# Patient Record
(demographics unavailable — no encounter records)

---

## 2024-11-12 NOTE — PHYSICAL EXAM
[No Acute Distress] : no acute distress [Well Nourished] : well nourished [Well Developed] : well developed [Well-Appearing] : well-appearing [Normal Sclera/Conjunctiva] : normal sclera/conjunctiva [PERRL] : pupils equal round and reactive to light [EOMI] : extraocular movements intact [Normal Outer Ear/Nose] : the outer ears and nose were normal in appearance [Normal Oropharynx] : the oropharynx was normal [No JVD] : no jugular venous distention [No Lymphadenopathy] : no lymphadenopathy [Supple] : supple [Thyroid Normal, No Nodules] : the thyroid was normal and there were no nodules present [No Respiratory Distress] : no respiratory distress  [No Accessory Muscle Use] : no accessory muscle use [Clear to Auscultation] : lungs were clear to auscultation bilaterally [Normal Rate] : normal rate  [Regular Rhythm] : with a regular rhythm [Normal S1, S2] : normal S1 and S2 [No Murmur] : no murmur heard [No Carotid Bruits] : no carotid bruits [No Abdominal Bruit] : a ~M bruit was not heard ~T in the abdomen [No Varicosities] : no varicosities [Pedal Pulses Present] : the pedal pulses are present [No Edema] : there was no peripheral edema [No Palpable Aorta] : no palpable aorta [No Extremity Clubbing/Cyanosis] : no extremity clubbing/cyanosis [Soft] : abdomen soft [Non Tender] : non-tender [Non-distended] : non-distended [No HSM] : no HSM [Normal Bowel Sounds] : normal bowel sounds [Normal Posterior Cervical Nodes] : no posterior cervical lymphadenopathy [Normal Anterior Cervical Nodes] : no anterior cervical lymphadenopathy [No CVA Tenderness] : no CVA  tenderness [No Spinal Tenderness] : no spinal tenderness [No Joint Swelling] : no joint swelling [Grossly Normal Strength/Tone] : grossly normal strength/tone [No Rash] : no rash [Coordination Grossly Intact] : coordination grossly intact [No Focal Deficits] : no focal deficits [Normal Gait] : normal gait [Deep Tendon Reflexes (DTR)] : deep tendon reflexes were 2+ and symmetric [Normal Affect] : the affect was normal [Normal Insight/Judgement] : insight and judgment were intact [Normal TMs] : both tympanic membranes were normal [Normal Appearance] : normal in appearance [No Masses] : no palpable masses [No Axillary Lymphadenopathy] : no axillary lymphadenopathy

## 2024-11-12 NOTE — HISTORY OF PRESENT ILLNESS
[FreeTextEntry1] : The patient is a 62 year old female who presents for annual physical examination. [de-identified] : Patient is a 61-year-old female with past medical history significant for osteoporosis, GERD, IBS, hyperlipidemia, hypothyroidism, lactose intolerance, presents for annual physical.  Reports overall she has been feeling well. Up-to-date with mammogram, GYN exam, bone density, and colonoscopy. Declines influenza vaccine.

## 2024-11-12 NOTE — HEALTH RISK ASSESSMENT
[Good] : ~his/her~  mood as  good [No] : No [No falls in past year] : Patient reported no falls in the past year [0] : 2) Feeling down, depressed, or hopeless: Not at all (0) [Never] : Never [Patient reported PAP Smear was normal] : Patient reported PAP Smear was normal [With Family] : lives with family [# of Members in Household ___] :  household currently consist of [unfilled] member(s) [Employed] : employed [Graduate School] : graduate school [] :  [# Of Children ___] : has [unfilled] children [Feels Safe at Home] : Feels safe at home [Fully functional (bathing, dressing, toileting, transferring, walking, feeding)] : Fully functional (bathing, dressing, toileting, transferring, walking, feeding) [Fully functional (using the telephone, shopping, preparing meals, housekeeping, doing laundry, using] : Fully functional and needs no help or supervision to perform IADLs (using the telephone, shopping, preparing meals, housekeeping, doing laundry, using transportation, managing medications and managing finances) [Smoke Detector] : smoke detector [Carbon Monoxide Detector] : carbon monoxide detector [Seat Belt] :  uses seat belt [Sunscreen] : uses sunscreen [de-identified] : Has been exercising on treadmill and weights [[id number]] : 0 [Change in mental status noted] : No change in mental status noted [Language] : denies difficulty with language [Behavior] : denies difficulty with behavior [Handling Complex Tasks] : denies difficulty handling complex tasks [Reasoning] : denies difficulty with reasoning [Reports changes in hearing] : Reports no changes in hearing [Reports changes in vision] : Reports no changes in vision [Reports changes in dental health] : Reports no changes in dental health [MammogramDate] : 4/5/2024 [PapSmearDate] : 8/2024 [PapSmearComments] : Dr. Conte [BoneDensityDate] : 2/4/2024 [BoneDensityComments] : osteoporosis [ColonoscopyDate] : 08/2/2022 [ColonoscopyComments] : Repeat 8/2027 [de-identified] : Last eye exam 8/2024

## 2024-11-12 NOTE — HEALTH RISK ASSESSMENT
[Good] : ~his/her~  mood as  good [No] : No [No falls in past year] : Patient reported no falls in the past year [0] : 2) Feeling down, depressed, or hopeless: Not at all (0) [Never] : Never [Patient reported PAP Smear was normal] : Patient reported PAP Smear was normal [With Family] : lives with family [# of Members in Household ___] :  household currently consist of [unfilled] member(s) [Employed] : employed [Graduate School] : graduate school [] :  [# Of Children ___] : has [unfilled] children [Feels Safe at Home] : Feels safe at home [Fully functional (bathing, dressing, toileting, transferring, walking, feeding)] : Fully functional (bathing, dressing, toileting, transferring, walking, feeding) [Fully functional (using the telephone, shopping, preparing meals, housekeeping, doing laundry, using] : Fully functional and needs no help or supervision to perform IADLs (using the telephone, shopping, preparing meals, housekeeping, doing laundry, using transportation, managing medications and managing finances) [Smoke Detector] : smoke detector [Carbon Monoxide Detector] : carbon monoxide detector [Seat Belt] :  uses seat belt [Sunscreen] : uses sunscreen [de-identified] : Has been exercising on treadmill and weights [[id number]] : 0 [Change in mental status noted] : No change in mental status noted [Language] : denies difficulty with language [Behavior] : denies difficulty with behavior [Handling Complex Tasks] : denies difficulty handling complex tasks [Reasoning] : denies difficulty with reasoning [Reports changes in hearing] : Reports no changes in hearing [Reports changes in vision] : Reports no changes in vision [Reports changes in dental health] : Reports no changes in dental health [MammogramDate] : 4/5/2024 [PapSmearDate] : 8/2024 [PapSmearComments] : Dr. Conte [BoneDensityDate] : 2/4/2024 [BoneDensityComments] : osteoporosis [ColonoscopyDate] : 08/2/2022 [ColonoscopyComments] : Repeat 8/2027 [de-identified] : Last eye exam 8/2024

## 2024-11-12 NOTE — ASSESSMENT
[FreeTextEntry1] : Healthcare maintenance: Patient up-to-date with mammogram, GYN exam, bone density, and colonoscopy. Age-appropriate preventive care counseling and Healthcare maintenance discussed including but not limited to proper diet, exercise, routine dental care, skin cancer screening, and eye care.

## 2024-11-12 NOTE — HISTORY OF PRESENT ILLNESS
[FreeTextEntry1] : The patient is a 62 year old female who presents for annual physical examination. [de-identified] : Patient is a 61-year-old female with past medical history significant for osteoporosis, GERD, IBS, hyperlipidemia, hypothyroidism, lactose intolerance, presents for annual physical.  Reports overall she has been feeling well. Up-to-date with mammogram, GYN exam, bone density, and colonoscopy. Declines influenza vaccine.

## 2024-11-27 NOTE — ASSESSMENT
[FreeTextEntry1] : The patient is referred for initial consultation for osteoporosis, 11/27/2024.  Osteoporosis - Post-menopausal female - Told of osteopenia in 2021 which drifted to osteoporosis in 2014. - No falls, fx or osteoporosis medication.  - H/o kidney stones in pregnancy then when on calcium supplement for osteoporosis, now off supplement. States never told of hypercalcemia or hyperparathyroidism.  - H/o chronic PO steroid use in the 1990s.  - Supplements with Vit D3 unsure of dose. Level WNL 48.5 in 11/2024. OK to continue.  - Fh/o "severe" osteoporosis in mom - BMD 2024 shows osteoporosis in the hip and severe in the spine. BMD results were discussed with the patient.   Elevated alkaline phosphatase - Level high since 2019, up to 170 in 2022. - Isoenzyme measure shows mostly from bone, consistent since 2019. - [place] Bone Scan 2019 which showed mild diffuse increased uptake without focality possibly representing an underlying metabolic disease but no definitive evidence of metastatic or Pagetoid disease. - Discussed with Dr. Cornejo.   Given the patient's history and BMD, the patient is at an increased risk of future fracture. Options of medical therapy were discussed in detail including risks and benefits. Major decision is anabolic therapy followed by standard therapy vs standard therapy.    I discussed Rx with bisphosphonate therapy, including IV Reclast. I do not recommend PO due to h/o Yoder's esophagitis. Risks and benefits of bisphosphonate therapy were discussed including osteonecrosis of the jaw (ONJ), atypical femur fractures, and acute phase reaction. The patient would benefit from bisphosphonate therapy with the expectation of a drug holiday within 3-5 years.   I discussed Rx with twice a year Prolia injection. Risks and benefits of Prolia discussed including ONJ and atypical femur fracture. I discussed that the patient cannot stop Prolia without expecting rapid bone loss and an increase in risk for future fracture unless they transition to another Rx. Furthermore, there is 10 year safety data for Prolia.   The patient can consider bone anabolic therapy for more aggressive bone building treatment with Forteo and Tymlos. Risks and benefits discussed including blood Ca elevation, lightheadedness, palpitations, or dizziness. In animal models, long term use has shown increased risk for bone Ca, though never seen in humans. Pt is aware that PTH level will be drawn prior to starting medication and there is date on increased risk of kidney stones on Forteo, not Tymlos.    The patient can consider bone anabolic therapy for more aggressive bone building treatment with monthly Evenity (Romosozumab), an anti-sclersotin antibody given for 1 year. Risks and benefits discussed including cardiovascular issues.   The patient would follow anabolic therapy with another osteoporosis Rx (bisphosphonate, Prolia) to prevent bone loss.   All questions were answered. Rx information handout provided. The patient understands and elects to do blood work before making decision.   I discussed that weight bearing exercises, cardiovascular activities, and diet are beneficial to overall health, but are not adequate for bone density increase or alternative to osteoporosis medication.   Draw labs today including CMP, PTH, Phos, CTx, P1NP and BSAP.   Follow up TBD.    Vivek MS, Devon SL, Mario KL, Chepe EM, Chaya KG,  AJ, Nolvia ES. The clinician's guide to prevention and treatment of osteoporosis. Osteoporosis Int. 2022 Oct;33(10):6138-8677.

## 2024-11-27 NOTE — HISTORY OF PRESENT ILLNESS
[FreeTextEntry1] : Patient has been told of osteopenia in 2021 which drifted to osteoporosis in 2014. I do not have all the bone density data / images available from that time. BMD 2024 shows osteoporosis in the hip and severe in the spine.   Past osteoporosis medications: denies  Post-menopausal, age 52. No HRT. Mammogram: UTD History of breast cancer: denies. No chemo, RT ever.  Active lifestyle. Exercise: weightlifting Fractures: denies Frequent falls: denies Assistive device: denies Fhx: "severe" osteoporosis in mom, denies parental hip fracture.  Calcium: kefir, almonds, sardines, spinach/lettuce/kale Developed kidney stones during pregnancy s/p stent then had a reoccurrence when she started calcium supplement for osteoporosis, now off supplement for years. States never told of hypercalcemia or hyperparathyroidism. Sees Dr. Bhandari, Urology. Supplements with Vit D3 unsure of dose. H/o Yoder's esophagitis. Denies history of excessive alcohol intake, excessive soda intake, celiac disease, malabsorption, nutritional deficiencies, GIB, stomach ulcers.   Denies active smoking.   PMHx/PSHx:  Elevated alk phos, isoenzyme measure shows increase from bone, had [place] Bone scan 2019 which showed increased uptake but not metastatic disease or Paget's. Hypothyroidism on LT4 with PCP Mild MI in 2000 due to high dose of Epi given in ER, reportedly no damage to cardiac muscle. CT heart calcium score 0 in 2023.  H/o chronic PO steroid use for reactive arthritis in the 1990s, took prednisone for months.  Denies diabetes, CVD, blood clots.   Sees [facility] every 6 months. May need to have extraction, but waiting for second opinion.

## 2024-11-27 NOTE — PHYSICAL EXAM
[Alert] : alert [No Acute Distress] : no acute distress [Normal Sclera/Conjunctiva] : normal sclera/conjunctiva [EOMI] : extra ocular movement intact [No Proptosis] : no proptosis [No Respiratory Distress] : no respiratory distress [No Accessory Muscle Use] : no accessory muscle use [Normal Rate] : heart rate was normal [No Spinal Tenderness] : no spinal tenderness [Spine Straight] : spine straight [No Stigmata of Cushings Syndrome] : no stigmata of Cushings Syndrome [Normal Gait] : normal gait [Normal Strength/Tone] : muscle strength and tone were normal [No Tremors] : no tremors [Oriented x3] : oriented to person, place, and time

## 2024-11-27 NOTE — PROCEDURE
[FreeTextEntry1] : Bone Mineral Density: 2/5/2024  Indication: vs 2018 Spine (L1, L2, L3, L4): -4.0, osteoporosis, prior -3.5 Total hip: -2.7, osteoporosis, prior -1.8 Femoral neck: -3.0, osteoporosis Proximal radius: not measured

## 2024-11-27 NOTE — REASON FOR VISIT
[Consultation] : a consultation visit [Osteoporosis] : osteoporosis [FreeTextEntry2] : Dr. Dhillon (PCP)

## 2024-12-10 NOTE — PHYSICAL EXAM
[No Acute Distress] : no acute distress [Well Nourished] : well nourished [Well Developed] : well developed [Normal Sclera/Conjunctiva] : normal sclera/conjunctiva [EOMI] : extraocular movements intact [Normal Oropharynx] : the oropharynx was normal [No Edema] : there was no peripheral edema [Normal] : soft, non-tender, non-distended, no masses palpated, no HSM and normal bowel sounds [Normal Posterior Cervical Nodes] : no posterior cervical lymphadenopathy [Normal Anterior Cervical Nodes] : no anterior cervical lymphadenopathy [Grossly Normal Strength/Tone] : grossly normal strength/tone [No Rash] : no rash [No Focal Deficits] : no focal deficits [Normal Affect] : the affect was normal [Alert and Oriented x3] : oriented to person, place, and time [Normal Insight/Judgement] : insight and judgment were intact

## 2024-12-10 NOTE — HISTORY OF PRESENT ILLNESS
[FreeTextEntry1] : Patient is a 62-year-old female presents for acute visit. [de-identified] : Patient is a 62-year-old female with past medical history significant for osteoporosis, GERD, Yoder's esophagus, IBS, hyperlipidemia, hypothyroidism, lactose intolerance, presents for acute visit. She reports that over the past month she has been experiencing a "very dry mouth".  She developed an area of redness and irritation of the mucosa under her right upper lip.  Also feels a burning sensation in her mouth.  Has been trying to drink more water in the event that her symptoms were related to dehydration which has not significantly improved her symptoms.  She is also been applying baking soda and water to the underside of her right upper lip as well as coconut water, turmeric, and honey.  She denies any obvious ulcerations or lesions on her tongue.  She was seen by her dentist at the end of October shortly after developing symptoms and he did not note any abnormalities of her mucosa.  She denies any new mouthwashes or toothpaste's in recent months. Also has been experiencing dry eyes. She is taking vitamin C and vitamin B supplements daily with an occasional multivitamin. Appetite is good and her weight has remained stable. She is up-to-date with EGD and colonoscopy.

## 2024-12-10 NOTE — HISTORY OF PRESENT ILLNESS
[FreeTextEntry1] : Patient is a 62-year-old female presents for acute visit. [de-identified] : Patient is a 62-year-old female with past medical history significant for osteoporosis, GERD, Yoder's esophagus, IBS, hyperlipidemia, hypothyroidism, lactose intolerance, presents for acute visit. She reports that over the past month she has been experiencing a "very dry mouth".  She developed an area of redness and irritation of the mucosa under her right upper lip.  Also feels a burning sensation in her mouth.  Has been trying to drink more water in the event that her symptoms were related to dehydration which has not significantly improved her symptoms.  She is also been applying baking soda and water to the underside of her right upper lip as well as coconut water, turmeric, and honey.  She denies any obvious ulcerations or lesions on her tongue.  She was seen by her dentist at the end of October shortly after developing symptoms and he did not note any abnormalities of her mucosa.  She denies any new mouthwashes or toothpaste's in recent months. Also has been experiencing dry eyes. She is taking vitamin C and vitamin B supplements daily with an occasional multivitamin. Appetite is good and her weight has remained stable. She is up-to-date with EGD and colonoscopy.

## 2025-02-19 NOTE — HISTORY OF PRESENT ILLNESS
[FreeTextEntry1] : Patient has been told of osteopenia in 2021 which drifted to osteoporosis in 2024. I do not have all the bone density data / images available from that time. BMD 2024 shows osteoporosis in the hip and severe in the spine.   Past osteoporosis medications: denies  Post-menopausal, age 52. No HRT. Mammogram: UTD History of breast cancer: denies. No chemo, RT ever.  Active lifestyle. Exercise: weightlifting Fractures: denies Frequent falls: denies Assistive device: denies Fhx: "severe" osteoporosis in mom, denies parental hip fracture.  Calcium: kefir, almonds, sardines, spinach/lettuce/kale Developed kidney stones during pregnancy s/p stent then had a reoccurrence when she started calcium supplement for osteoporosis, now off supplement for years. States never told of hypercalcemia or hyperparathyroidism. Sees Dr. Bhandari, Urology. Supplements with Vit D3 unsure of dose. H/o Yoder's esophagitis.  Denies history of excessive alcohol intake, excessive soda intake, celiac disease, malabsorption, nutritional deficiencies, GIB, stomach ulcers.   Denies active smoking.   PMHx/PSHx:  Elevated alk phos, isoenzyme measure shows increase from bone, had [place] Bone scan 2019 which showed increased uptake but not metastatic disease or Paget's. Hypothyroidism on LT4 with PCP Mild MI in 2000 due to high dose of Epi given in ER, reportedly no damage to cardiac muscle. CT heart calcium score 0 in 2023.  H/o chronic PO steroid use for reactive arthritis in the 1990s, took prednisone for months.  Denies diabetes, CVD, blood clots.   Sees [facility] every 6 months. May need to have extraction but waiting for second opinion.

## 2025-02-19 NOTE — ASSESSMENT
[FreeTextEntry1] : Initial consultation for osteoporosis, 11/27/2024. Follow up.  Osteoporosis - Post-menopausal female - Told of osteopenia in 2021 which drifted to osteoporosis in 2024. - No falls, fx or osteoporosis medication.  - H/o kidney stones in pregnancy then when on calcium supplement for osteoporosis, now off supplement. States never told of hypercalcemia or hyperparathyroidism.  - H/o chronic PO steroid use in the 1990s.  - Supplements with Vit D3 unsure of dose. Level WNL 48.5 in 11/2024. OK to continue.  - Fh/o "severe" osteoporosis in mom - BMD 2024 shows osteoporosis in the hip and severe in the spine. BMD results were discussed with the patient.   Elevated alkaline phosphatase - Level high since 2019, up to 170 in 2022. - Isoenzyme measure shows mostly from bone, consistent since 2019. - NM Bone Scan 2019 which showed mild diffuse increased uptake without focality possibly representing an underlying metabolic disease but no definitive evidence of metastatic or Pagetoid disease.   Given the patient's history and BMD, the patient is at an increased risk of future fracture. Options of medical therapy were discussed in detail including risks and benefits. Major decision is anabolic therapy followed by standard therapy vs standard therapy.   All questions were answered. Rx information handout provided. The patient understands and elects to do blood work before making decision. Initial labs 11/2024 show calcium 10.2, albumin 4.8, renal function within normal limits, PTH 31, phosphorus 3.3, alk phos elevated to 162, and BSAP elevated to 43.3, P1NP elevated to 149.5, CTx 322. Discussed with Dr. Cornejo - likely high turnover, no need for repeat bone scan due to trend in alk phos.    I discussed that weight bearing exercises, cardiovascular activities, and diet are beneficial to overall health, but are not adequate for bone density increase or alternative to osteoporosis medication.   Follow up . Vivek MS, Devon SL, Mario KL, Chepe EM, Chaya KG,  AJ, Nolvia ES. The clinician's guide to prevention and treatment of osteoporosis. Osteoporosis Int. 2022 Oct;33(10):7264-7094.

## 2025-02-27 NOTE — HISTORY OF PRESENT ILLNESS
[FreeTextEntry1] : The patient has a 1 cm hiatal hernia and a history of biopsy evidence of reflux esophagitis.  She has been taking famotidine 40 mg at bedtime for the past month because she developed symptoms of epigastric burning along with awakening in the middle of the night with a cough and throat tickle/burning.  The famotidine has not really helped her.  She has had 3 episodes, 1 in December and 2 in January with sudden onset of nausea, vomiting, and diarrhea with multiple episodes of vomiting and diarrhea that last about 8 hours at a time and resolve spontaneously.  She has not had any episodes in February.  Other than that, she generally has 1 solid bowel movement a day without melena or bright red blood per rectum.  She takes magnesium glycine 8 to help her move her bowels.  She has had mild weight gain and now weighs 101 pounds.   Note from 5/1/2024 - The patient has a history of a 1 cm hiatal hernia with biopsy evidence of reflux esophagitis.  She had been on pantoprazole but now takes it only on a as needed basis.  She is concerned regarding the fact that she was diagnosed with osteoporosis and has been trying to avoid the PPI.  She also had previously been on MiraLAX but has been off this for a while.  She had been taking a magnesium pill a day and has been having a bowel movement every 1 to 2 days.  Last week, the patient had 5 days of significant diarrhea and cramping.  She went to urgent care twice.  The diarrhea resolved on Sunday and she has not moved her bowels over the past 3 days.  There was no melena, bright red blood per rectum, fever, nausea, vomiting.  She now has shooting right lower quadrant pain extending to the back that occurs intermittently over the past 3 days.  She also gets occasional heartburn about once a week.  The patient has not been admitted to the hospital in the past year and denies any cardiac issues.   Note from 10/31/2022 - The patient underwent EGD and colonoscopy on August 2, 2022.  EGD was significant for a 1 cm hiatal hernia with biopsy evidence of reflux esophagitis.  Colonoscopy was normal other than external hemorrhoids which are rarely symptomatic.  Random right and left colonic biopsies were negative.  The patient was seen in the office on August 5, 2022 with postprocedure abdominal pain.  She was sent to the ER where CT scan was negative and she was sent home.  The patient gradually felt better and those symptoms all resolved.  She is currently on MiraLAX and a probiotic daily.  She does complain of epigastric burning/chest burning which occurs a few times a week.  She also notes a cough with laying down.  She denies dysphagia.  She moves her bowels once every 1 to 2 days on the MiraLAX.  The stools are solid without melena or bright red blood per rectum.  She has now been gaining weight.   Note from 8/5/2022 - The patient is seen acutely after undergoing EGD and colonoscopy on August 2, 2022.  EGD was significant for a 1 cm hiatal hernia with an irregular Z-line with biopsies showing reflux esophagitis.  The patient also has a patulous pylorus.  Colonoscopy was normal with negative random right and left colonic biopsies.  The patient has external hemorrhoids which are generally asymptomatic.  On the day after the procedure, the patient states that she developed lower back pain which is worsening.  She also feels weakness and shakiness.  She notes decreased appetite.  She denies fever or cough.  She took a home COVID test today which was negative.  We also reviewed the recent studies that were done.  Gastric emptying scan was normal on May 5, 2022.  Stool tests showed no fat and normal fecal elastase.   Note from 4/7/2022 - We reviewed the events and evaluations since the patient's last visit on May 17, 2021.  H. pylori stool antigen was negative.  The patient took Amitiza 24 mcg twice daily through October and felt much better.  Her bowel movements were regular.  She ran out of the medication and still did well for a while.  She went to the [facility] emergency room with pain on December 1 and had a CT scan which revealed a prominent gastric wall which may be due to partial underdistention and/or gastritis.  Endoscopy was recommended.  The patient went home and felt better with occasional bloating.  She started taking MiraLAX in January.  She had Covid in February 2022 with 10 days of fever.  She was not treated directly for Covid but was treated with Zithromax, which gave her an allergic reaction.  She recovered from the Covid.  She now complains of left upper quadrant pain radiating to the back.  She denies nausea vomiting.  She also notes early satiety.  She has small amounts of heartburn.  She denies dysphagia.  On Sunday, the patient had 5 episodes of diarrhea with oily stool but since then her stools are solid.  She denies melena, bright red blood per rectum, or fever.  Of note, the patient recalls having had problems with anesthesia during the last endoscopic procedure.   Note from 5/17/2021 - We reviewed the evaluations done since the patient's last visit of December 23, 2020.  Blood work from that day showed a mildly elevated amylase of 115 with mildly elevated pancreatic and salivary isoenzymes, only barely above normal.  Alkaline phosphatase was 146 with normal isoenzymes.  An abdominal ultrasound performed on December 26, 2020 was normal.  A subsequent MRI/MRCP performed on January 23, 2021 was also normal.  The patient was treated for H pylori with a regimen of rifabutin, minocycline, and pantoprazole.  The patient only took 7 days of the 10-day course because of weakness and shaking.  The patient also reports that she has been on a gluten-free and low FODMAPs diet.  She is feeling better and reports that she does not have nearly as much abdominal pain as she used to.  The patient was taking an Ensure daily but then was found to have a hemoglobin A1c of 6.8 and was told to stop this.  She has lost more weight and now weighs 91 pounds.  The patient reports occasional mild epigastric burning.  She denies heartburn or dysphagia.  The patient has been having constipation and had a period where she went 5 days without a bowel movement with associated left-sided pain.  She was on MiraLAX daily which is never helped her.  She tried milk of magnesia with minimal results.  She denies melena or bright red blood per rectum.   Note from 12/23/2020 - Since the patient's last visit on December 8, 2020, the patient took Xifaxan for small intestinal bacterial overgrowth.  She took it for 10 days but developed constipation despite being on MiraLAX along with lower back pain, cramping, and burning in both arms.  She stopped the medication yesterday.  Today she had 2 good bowel movements with the MiraLAX.  She denies melena or bright red blood per rectum.  She has been having a bowel movement every 1 to 2 days.  She denies nausea, vomiting, heartburn, dysphagia.  She reports a good appetite but eats small amounts because she does not feel well if she eats too much at one time.  She has continued to lose weight and currently weighs 95 pounds.  She has chronic H. pylori infection for which we have been unable to treat due to her multiple allergies.  I had contacted her after finding a regimen that does not include a medication she is allergic to.  She has not taken this yet as she was on the Xifaxan.   Note from 12/8/2020 - We reviewed the patient's EGD performed on September 24, 2020.  An irregular Z-line was seen but biopsies were negative for any evidence of intestinal metaplasia/Oyder's.  Biopsies from the stomach again showed the presence of H. pylori infection.  This has been seen in the past but the patient has been unable to receive treatment given her allergies to penicillin, Flagyl, and Levaquin, all of which have caused rash and hives.  The patient does get burning in her epigastrium and in her throat.  She had been on pantoprazole in the past but stopped this in September as she states that she did not feel that it helped her.    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  She gets intermittent pain in the left upper quadrant and left lower quadrant.  Yesterday, the patient developed left lower quadrant pain which was sharp and severe although it is much better today but remains as a dull pain.  The patient denies dysuria or hematuria.  She states that she is seen her gynecologist recently.  The patient has a history of constipation but has been taking MiraLAX daily and is moving her bowels once every other day.  The patient took a 14-day course of Xifaxan back in May, which she states helped her symptoms.  She has also been on a low FODMAPs diet, again which has led to improvement in her symptoms as well.  The patient does state that the effects of the Xifaxan have worn off and the symptoms of bloating have returned.  She reports that she weighs 97 pounds, slightly lower than the past.   Note from 5/13/2020 - We reviewed the evaluations done since the patient's last visit on November 22, 2019.  The patient underwent fructose breath testing which was positive for fructose intolerance.  The patient then had lactulose breath testing performed which was positive for small intestinal bacterial overgrowth with predominantly hydrogen producing microorganisms with some methane production.  The patient has a history of Yoder's esophagus although this was not seen on the last endoscopy.  She also has a history of a CT scan on November 9, 2019 showing moderate to severe stool burden suggestive of constipation.  The patient was treated with 14 days of Xifaxan 550 mg 3 times daily in February and feels that this helped her with "90% improvement.  She also consulted with a dietitian, Wilfred Glass, who started her on a low FODMAPs diet which she has been on since January.  The patient did very well in March without pain or need for MiraLAX.  She remains on pantoprazole 40 mg a day.  Last week, the patient developed recurrent abdominal pain in the left upper quadrant same as she had previously.  She also has had mild throat burning and bad taste in the mouth.  She complains of early satiety and feels like food is sitting in her stomach.  She also has had recurrent constipation.  She had one loose bowel movement last week but then went 2 to 3 days without a bowel movement.  She reports bloating and belching.  She has lost 1 to 2 pounds.  She saw her rheumatologist yesterday for joint and muscle pains and was placed on prednisone which she has not yet started.  The patient has not been admitted to the hospital in the past year and denies any cardiac issues.   Note from 11/22/2019 - We reviewed the patient's evaluations done since her last visit on June 28, 2019.  She has H. pylori which has not been treated secondary to her multiple allergies to medications.  Blood work from June revealed an alkaline phosphatase of 138 with a GGTP of 9 and a fractionation showing 70% bone etiology.  She has an elevated JOHANA of 1-3 20 and a ferritin of 13 with a 15% iron saturation.  Ultrasound performed on July 5, 2019 reveals a small nonobstructing kidney stone but is otherwise negative.  The patient had a bone scan that showed mild diffuse uptake which was thought to possibly represent hyperparathyroidism.  Blood work on September 21, 2019 showed normal PTH and calcium levels.  The patient took pantoprazole for 3 months and felt better but then stopped the medication.  She also took Amitiza for 1 month and stopped it after it did not help with her symptoms.  On Saturday, the patient went to the emergency room with 1 day of vomiting and diarrhea.  She was given IV fluid and her symptoms resolved.  She has had no bowel movement until today.  She continues to get left upper quadrant "inflammation" and pain which radiates to the back that last 2 to 3 days.  The patient will then decrease food intake and the pain will go away for a little while.  She has these episodes about once a week.  She also complains of bloating and gas.  She avoids dairy and is decreased gluten in her diet.  She continues to have heartburn and early satiety.  She also continues to have constipation.  She can go 7 days without a bowel movement and then have diarrhea.  The patient's weight is stable.   Note from 6/28/2019 - The patient has been seeing Dr. Hernandez and had EGD and colonoscopy showing ongoing H. pylori infection which cannot be treated due to the patient's multiple allergies. She had recent blood work showing an elevated alkaline phosphatase 152 and AST of 36. She has had elevated liver tests in the past. She complains of intermittent epigastric pain and bloating for which she takes Gas-X with some relief. She also gets frequent heartburn. She is constipated moving her bowels once every 4 days. She has been taking flaxseed. She denies melena or blood per rectum. The patient's weight is stable.

## 2025-02-27 NOTE — CONSULT LETTER
[FreeTextEntry1] : Dear Dr. Berta Dhillon,\par  \par  I had the pleasure of seeing your patient DANIEL PIPER in the office today.  My office note is attached. PLEASE READ THE "ASSESSMENT" SECTION OF THE NOTE TO SEE MY IMPRESSION AND PLAN.\par  \par  Thank you very much for allowing me to participate in the care of your patient.\par  \par  Sincerely,\par  \par  Laz Gonzalez M.D., FAC, FACP\par  Director, Celiac Program at [facility]\par   of Medicine, [employer] at [facility]  Adjunct  of Medicine, [employer]\[facility]  Practice Director, [facility] - Gastroenterology at [facility]  [address]\[place] [address]\par  Tel: [phone]\par  Email: [email or web address]  \par  \par  The attached note has been created using a voice recognition system (Dragon).  There may be some misspellings and typos.  Please call my office if you have any issues or questions.

## 2025-02-27 NOTE — ASSESSMENT
[FreeTextEntry1] : Patient with a 1 cm hiatal hernia and biopsy evidence of reflux esophagitis who has been experiencing epigastric burning and awakening at night with throat burning and tickle and cough.  She also has had 3 episodes of sudden onset of nausea, vomiting, and diarrhea that can last 8 hours.  I have started the patient on pantoprazole 40 mg a day to be taken in the morning before breakfast.  A list of dietary and lifestyle modifications in the treatment of GERD was given to the patient.  We discussed this in depth.  The patient was sent for an abdominal ultrasound to rule out biliary etiology of the episodes of nausea and vomiting.  The patient will call me in 2 to 3 weeks to give me an update.  If the ultrasound is unrevealing and symptoms persist, we would then proceed with a HIDA scan.  Patient is due for colonoscopy in August 2027.   Plan from 5/1/2024 - Patient status post what sounds like a self-limited gastroenteritis last week.  She had diarrhea but now has not moved her bowels over the past 3 days.  She has intermittent shooting right lower quadrant pain over the past 3 days.  This is likely related to the gastroenteritis and will likely resolve.  The patient was advised to observe her symptoms over the next week.  She will restart magnesium pills which have helped her have regular bowel movements.  If the pain persist beyond a week, we will then get a CT scan of the abdomen and pelvis to rule out more chronic etiology.  The patient was also given famotidine 40 mg to use on a as needed basis as she is concerned about the use of PPI.  Patient is due for colonoscopy in August 2027.   Plan from 10/31/2022 - Patient with a 1 cm hiatal hernia and biopsy evidence of reflux esophagitis who has been having epigastric burning and cough with laying down.  She has constipation which is controlled with daily MiraLAX.  I have started the patient on pantoprazole 20 mg a day.  She was advised to avoid late night eating.  She was advised to stay upright for 2 to 3 hours after eating.  She was also advised regarding keeping her head of the bed elevated at night.  Patient will continue MiraLAX daily.  We will repeat a colonoscopy in 5 to 10 years.  Patient will return to see me in 1 year or sooner if needed.   Plan from 8/5/2022 - Patient with significant epigastric tenderness on physical exam with radiation of the pain to the back.  She is status post EGD and colonoscopy 3 days ago.  The exams were significant only for a 1 cm hiatal hernia with evidence of reflux esophagitis.  The patient complains of lower back pain, weakness, shakiness, and decreased appetite.  The patient was advised to go to the emergency room for urgent evaluation including blood work and CT scan.  I am concerned regarding pancreatitis but also must exclude perforation despite the fact that the patient's abdomen is soft.  The patient also would benefit from intravenous fluid.  She has opted to go to [facility].  We are covered by the [facility] in the hospital.   Plan from 4/7/2022 - Patient with multiple GI issues.  Most recently, she has left upper quadrant pain rating to the back along with early satiety.  She had oily stool over the weekend but generally has constipation and has done well on Amitiza in the past.  CT scan in the emergency room revealed a prominent gastric wall, which may or may not be significant.  An EGD and colonoscopy have been scheduled. The risks, benefits, alternatives, and limitations of the procedures, including the possibility of missed lesions, were explained.  The patient will require anesthesia evaluation prior to the procedures given the reported history of difficulty with anesthesia during the last procedure.  Patient was sent for a gastric emptying scan to rule out gastroparesis.  Stool will be sent for fecal elastase and fat.  I restarted the patient on Amitiza 24 mcg twice daily which provided relief to the patient in the past.   Plan from 5/17/2021 - Patient status post treatment for H pylori of which she only completed 7 out of a planned 10-day course due to side effects.  She states that she feels better after this treatment and is also been on a gluten-free and low FODMAPs diet.  The patient continues to lose weight and also complains of constipation.  An H. pylori stool antigen will be sent to assess for eradication.  I have started the patient on Amitiza 24 mcg twice daily.  As the patient cannot take Ensure due to an elevated hemoglobin A1c, I advised consideration of Glucerna.  Patient will return to see me in 1 month to assess her response to the Amitiza.  Patient is due for colonoscopy in September 2022.   Plan from 12/23/2020 - Patient with weight loss and constipation.  She took 10 days of Xifaxan but stopped it early due to side effects for her small intestinal bacterial overgrowth.  The patient is moving her bowels on MiraLAX.  H. pylori he has not yet been treated.  Bloodwork was sent for CBC, chem-pack, TSH, celiac markers, amylase, lipase.  Patient was sent for an abdominal ultrasound.  I have advised the patient that she must take in more calories.  She was advised to add Ensure 2 to 3 cans a day to her diet.  Patient will begin a treatment regimen for H. pylori at the end of January which does not contain any medicine she is allergic to.  She will take right for Butin 150 mg twice daily, minocycline 100 mg twice daily, pantoprazole 40 mg twice daily for 10 days.  Patient is due for colonoscopy in September 2022.  Patient will return to see me in mid March to assess her response to the H. pylori treatment.   Plan from 12/8/2020 - Patient with ongoing H. pylori infection that has been unable to be treated due to her allergies to penicillin, Flagyl, and Levaquin.  She gets intermittent abdominal pain mainly in the left lower quadrant but had particularly sharp pain yesterday.  The pain is now back to a dull pain today.  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  She has responded to 14-day courses of Xifaxan in the past.  She also has a history of constipation but is moving her bowels every other day on daily MiraLAX.  I advised the patient that I will research to try to find any H. pylori regimen in the literature that does not include one of the medications that she is allergic to.  I will contact her regarding treatment.  If I am unable to find something, we will consider infectious disease and/or allergy consult.  Patient was given a 14-day course of Xifaxan 550 mg 3 times daily for her SIBO as she has shown a response to this in the past.  Patient will continue MiraLAX daily.  We will hold off on restarting pantoprazole as she states that this is not helped her.  Patient will return to see me in 2 weeks for an in person visit.  She was advised to call immediately if her abdominal pain worsens as I was unable to examine her today given that this was a televisit.  Patient is due for colonoscopy in September 2022.   Plan from 5/13/2020 - Patient found to have small intestinal bacterial overgrowth and fructose intolerance by breath testing.  She has a history of constipation.  She also has a history of Yoder's esophagus although this was not seen on her last endoscopy.  She had significant improvement in her symptoms after a 2-week course of Xifaxan and instituting a low FODMAPs diet.  She developed recurrent symptoms of abdominal pain, constipation, early satiety, bloating last week.  We will treat with a second 14-day course of Xifaxan 550 mg 3 times daily.  Patient will restart MiraLAX daily.  She will continue pantoprazole 40 mg a day and the low FODMAPs diet.  Once the COVID-19 pandemic allows, an EGD will be scheduled. The risks, benefits, alternatives, and limitations of the procedure were explained.  Patient is due for colonoscopy in September 2022.   Plan from 11/22/2019 - Patient with chronically elevated alkaline phosphatase that is of bone origin.  She has complaints of heartburn, early satiety, left upper quadrant pain radiating to the back episodically, bloating, gas.  She did have a response to pantoprazole in the past but stopped this.  She did not have a response to Amitiza.  Patient was sent for CT scan of the abdomen and pelvis to evaluate the left upper quadrant pain.  Patient was sent for breath testing for small intestinal bacterial overgrowth and fructose intolerance.  Patient was advised to take MiraLAX 17 g a day to help her move her bowels.  Patient was advised to restart pantoprazole 40 mg a day.  As the patient's symptoms appear to be dietary related, I strongly advised seeing a nutritionist.  The patient was given the name of a nutitionist, Wilfred Glass, to see.  Patient was advised to see a rheumatologist regarding her elevated JOHANA.  Patient is due for EGD in September 2020.  She has a history of Yoder's esophagus which was not seen on her last endoscopy.  Patient is due for colonoscopy in September 2022.   Plan from 6/28/2019 - Patient with elevated alkaline phosphatase and AST. She has H. pylori infection which has not been treated as she is allergic to medicines in every regimen to treat this. She has symptoms of epigastric pain, bloating, heartburn, and constipation. This appears to be due to irritable bowel syndrome.  Blood work was sent for LFTs, PT, alpha-1 antitrypsin, alpha-fetoprotein, JOHANA, ANCA, ceruloplasmin, iron studies, GGTP, celiac markers, hepatitis A., B., C. serologies, lipid profile, AMA, anti-smooth muscle antibody, anti-LKM antibody, anti-soluble liver antigen antibody, and alkaline phosphatase isoenzymes.  Patient was sent for an abdominal ultrasound.  I discussed IBS with the patient in depth. Information was given to her regarding a low FODMAPs diet. I advised that she see a nutritionist. She informs me that both of her sisters are nutritionists and she will see one of them regarding this.  The patient was started on pantoprazole 40 mg a day and Amitiza 8 mcg b.i.d.  The patient returned to see me in one month to assess her response to the medications and diet.

## 2025-07-02 NOTE — PHYSICAL EXAM
[de-identified] : Constitutional: Well nourished , well developed and in no acute distress Psychiatric: Alert and oriented to time place and person.Appropriate affect . Skin: Head, neck, arms and lower extremities:no lesions or discoloration HEENT: Normocephalic, EOM intact, Nasal septum midline, Respiratory: Unlabored respirations,no audible wheezing ,no tachypnea, no cyanosis Cardiovascular: No leg swelling no ankle edema no JVD, pulse regular Vascular: No calf or thigh tenderness, Peripheral pulses: intact Lymphatics: No groin adenopathy,no lymphedema lower or upper extremities     o Left Hip Exam:   Inspection/Palpation : no tenderness, no swelling, no deformity Leg Lengths: equal Passive Range of Motion: flexion 120 degrees, internal 20 degrees, external 65 degrees, abduction   40 degrees, adduction 30 degrees Strength: resisted hip flexion 5/5, resisted hip abduction 5/5 Skin : no erythema, no ecchymosis Sensation: sensation to light touch intact Reflexes: Patella 1+ R=L, Achilles 1+ R=L Motor Power Strength: EHL and tibialis anterior 5/5 Vascular Exam : no edema, no cyanosis [de-identified] : AP Pelvis and AP/Lateral views of the left hip dated 07/02/2025 demonstrate joint space maintained. Symmetrical

## 2025-07-02 NOTE — ADDENDUM
[FreeTextEntry1] : This note was written by Zofia Kong on 07/02/2025 acting solely as a scribe for Dr. Raphael Sanchez.    All medical record entries made by the Scribe were at my, Dr. Raphael Sanchez, direction and personally dictated by me on 07/02/2025. I have personally reviewed the chart and agree that the record accurately reflects my personal performance of the history, physical exam, assessment and plan.

## 2025-07-02 NOTE — HISTORY OF PRESENT ILLNESS
[FreeTextEntry1] : Patient is a 62-year-old female for follow-up [de-identified] : Patient is a 62-year-old female with past medical history significant for osteoporosis, GERD, Yoder's esophagus, IBS, hyperlipidemia, hypothyroidism, lactose intolerance, presents for acute visit. Patient reports feeling very tired and fatigued. Has been waking up in the middle of the night often and not getting enough rest.  Also , since Friday developed left hip/ buttock pain which radiates to her left leg. Seen by orthopedist earlier today.  Hip and pelvis x-rays unrevealing. Referred to physiatrist which is scheduled for August. Also c/o generalized aches and pains. Has also had chronic intermittent itching of skin without obvious rash.  Seen by dermatologist  1-2 months ago and "did not find anything" Apetite is good, weight is stable. Up to date with mammogram 4/2025- followed by Dr. Maldonado. Under care of rheumatologist for osteoporosis. Did not tolerate fosamax. Considering prolia injections.

## 2025-07-02 NOTE — PHYSICAL EXAM
[de-identified] : Constitutional: Well nourished , well developed and in no acute distress Psychiatric: Alert and oriented to time place and person.Appropriate affect . Skin: Head, neck, arms and lower extremities:no lesions or discoloration HEENT: Normocephalic, EOM intact, Nasal septum midline, Respiratory: Unlabored respirations,no audible wheezing ,no tachypnea, no cyanosis Cardiovascular: No leg swelling no ankle edema no JVD, pulse regular Vascular: No calf or thigh tenderness, Peripheral pulses: intact Lymphatics: No groin adenopathy,no lymphedema lower or upper extremities     o Left Hip Exam:   Inspection/Palpation : no tenderness, no swelling, no deformity Leg Lengths: equal Passive Range of Motion: flexion 120 degrees, internal 20 degrees, external 65 degrees, abduction   40 degrees, adduction 30 degrees Strength: resisted hip flexion 5/5, resisted hip abduction 5/5 Skin : no erythema, no ecchymosis Sensation: sensation to light touch intact Reflexes: Patella 1+ R=L, Achilles 1+ R=L Motor Power Strength: EHL and tibialis anterior 5/5 Vascular Exam : no edema, no cyanosis [de-identified] : AP Pelvis and AP/Lateral views of the left hip dated 07/02/2025 demonstrate joint space maintained. Symmetrical

## 2025-07-02 NOTE — DISCUSSION/SUMMARY
[de-identified] : 62-year-old female with right buttock pain and RLE radiculopathy. We discussed at length the nature of the patient's condition. We discussed all options and conservative measures, such as ice, NSAIDs, physical therapy, exercise limitations, and injections. I explained that clinically and radiographically her hip joint is normal; pain may be related to lumbar spine pathology.   A referral to Physiatry was provided to the patient to evaluate radiating pains.  Follow up PRN.    The patient understood and verbally agreed to the treatment plan. All of their questions were answered. The patient should call the office if they have any questions or experience worsening symptoms.

## 2025-07-02 NOTE — HISTORY OF PRESENT ILLNESS
[de-identified] : DANIEL PIPER is a 62-year-old female presenting to the office complaining of left hip pain. Patient presents ambulating independently. Patient reports pain for 2 years. Denies any trauma or injury. Patient complains of intermittent pain in the buttock and groin that radiates down the leg and to the foot. The pain is dull and burning, provocation of pain by weightbearing, walking, and sitting for long periods of time. She also c/o stiffness.  Denies any numbness or tingling of the lower extremities. Patient is a teacher. Of note, she reports she had a bone scan years ago that found elevate alkaline phosphatase and per patient increased uptake in the hip.

## 2025-07-02 NOTE — HISTORY OF PRESENT ILLNESS
[de-identified] : DANIEL PIPER is a 62-year-old female presenting to the office complaining of left hip pain. Patient presents ambulating independently. Patient reports pain for 2 years. Denies any trauma or injury. Patient complains of intermittent pain in the buttock and groin that radiates down the leg and to the foot. The pain is dull and burning, provocation of pain by weightbearing, walking, and sitting for long periods of time. She also c/o stiffness.  Denies any numbness or tingling of the lower extremities. Patient is a teacher. Of note, she reports she had a bone scan years ago that found elevate alkaline phosphatase and per patient increased uptake in the hip.

## 2025-07-02 NOTE — DISCUSSION/SUMMARY
[de-identified] : 62-year-old female with right buttock pain and RLE radiculopathy. We discussed at length the nature of the patient's condition. We discussed all options and conservative measures, such as ice, NSAIDs, physical therapy, exercise limitations, and injections. I explained that clinically and radiographically her hip joint is normal; pain may be related to lumbar spine pathology.   A referral to Physiatry was provided to the patient to evaluate radiating pains.  Follow up PRN.    The patient understood and verbally agreed to the treatment plan. All of their questions were answered. The patient should call the office if they have any questions or experience worsening symptoms.

## 2025-07-11 NOTE — CONSULT LETTER
[FreeTextEntry1] : Dear Dr. Berta Dhillon,\par  \par  I had the pleasure of seeing your patient DANIEL PIPER in the office today.  My office note is attached. PLEASE READ THE "ASSESSMENT" SECTION OF THE NOTE TO SEE MY IMPRESSION AND PLAN.\par  \par  Thank you very much for allowing me to participate in the care of your patient.\par  \par  Sincerely,\par  \par  Laz Gonzalez M.D., FAC, FACP\par  Director, Celiac Program at [facility]\par   of Medicine, [employer] at [facility]  Adjunct  of Medicine, [facility] [employer]\[facility]  Practice Director, [facility] - Gastroenterology at [facility]  [address]\[place] [address]\par  Tel: [phone]\par  Email: [email or web address]\par  \par  \par  The attached note has been created using a voice recognition system (Dragon).  There may be some misspellings and typos.  Please call my office if you have any issues or questions.

## 2025-07-11 NOTE — ASSESSMENT
[FreeTextEntry1] : Patient with new elevated transaminases.  She has a chronically elevated alkaline phosphatase which appears to be from bony etiology.  She had a normal abdominal ultrasound in March.  She has a history of reflux esophagitis and a 1 cm hiatal hernia.  She does get early satiety and mild nausea.  We must consider acute and chronic etiologies of liver disease.  Blood work was sent for LFTs, PT, alpha-1 antitrypsin, alpha-fetoprotein, JOHANA, ANCA, ceruloplasmin, iron studies, GGTP, celiac markers, hepatitis A., B., C. serologies, lipid profile, AMA, anti-smooth muscle antibody, anti-LKM antibody, anti-soluble liver antigen antibody, CBC, alkaline phosphatase isoenzymes, CMV IgG and IgM.  The patient will return to see me in 6 weeks.  We will reassess the liver at that time.  The patient will need an EGD but we should first assess the liver.  Patient is due for colonoscopy in August 2027.   Plan from 2/27/2025 - Patient with a 1 cm hiatal hernia and biopsy evidence of reflux esophagitis who has been experiencing epigastric burning and awakening at night with throat burning and tickle and cough.  She also has had 3 episodes of sudden onset of nausea, vomiting, and diarrhea that can last 8 hours.  I have started the patient on pantoprazole 40 mg a day to be taken in the morning before breakfast.  A list of dietary and lifestyle modifications in the treatment of GERD was given to the patient.  We discussed this in depth.  The patient was sent for an abdominal ultrasound to rule out biliary etiology of the episodes of nausea and vomiting.  The patient will call me in 2 to 3 weeks to give me an update.  If the ultrasound is unrevealing and symptoms persist, we would then proceed with a HIDA scan.  Patient is due for colonoscopy in August 2027.   Plan from 5/1/2024 - Patient status post what sounds like a self-limited gastroenteritis last week.  She had diarrhea but now has not moved her bowels over the past 3 days.  She has intermittent shooting right lower quadrant pain over the past 3 days.  This is likely related to the gastroenteritis and will likely resolve.  The patient was advised to observe her symptoms over the next week.  She will restart magnesium pills which have helped her have regular bowel movements.  If the pain persist beyond a week, we will then get a CT scan of the abdomen and pelvis to rule out more chronic etiology.  The patient was also given famotidine 40 mg to use on a as needed basis as she is concerned about the use of PPI.  Patient is due for colonoscopy in August 2027.   Plan from 10/31/2022 - Patient with a 1 cm hiatal hernia and biopsy evidence of reflux esophagitis who has been having epigastric burning and cough with laying down.  She has constipation which is controlled with daily MiraLAX.  I have started the patient on pantoprazole 20 mg a day.  She was advised to avoid late night eating.  She was advised to stay upright for 2 to 3 hours after eating.  She was also advised regarding keeping her head of the bed elevated at night.  Patient will continue MiraLAX daily.  We will repeat a colonoscopy in 5 to 10 years.  Patient will return to see me in 1 year or sooner if needed.   Plan from 8/5/2022 - Patient with significant epigastric tenderness on physical exam with radiation of the pain to the back.  She is status post EGD and colonoscopy 3 days ago.  The exams were significant only for a 1 cm hiatal hernia with evidence of reflux esophagitis.  The patient complains of lower back pain, weakness, shakiness, and decreased appetite.  The patient was advised to go to the emergency room for urgent evaluation including blood work and CT scan.  I am concerned regarding pancreatitis but also must exclude perforation despite the fact that the patient's abdomen is soft.  The patient also would benefit from intravenous fluid.  She has opted to go to [facility].  We are covered by the [facility] in the hospital.   Plan from 4/7/2022 - Patient with multiple GI issues.  Most recently, she has left upper quadrant pain rating to the back along with early satiety.  She had oily stool over the weekend but generally has constipation and has done well on Amitiza in the past.  CT scan in the emergency room revealed a prominent gastric wall, which may or may not be significant.  An EGD and colonoscopy have been scheduled. The risks, benefits, alternatives, and limitations of the procedures, including the possibility of missed lesions, were explained.  The patient will require anesthesia evaluation prior to the procedures given the reported history of difficulty with anesthesia during the last procedure.  Patient was sent for a gastric emptying scan to rule out gastroparesis.  Stool will be sent for fecal elastase and fat.  I restarted the patient on Amitiza 24 mcg twice daily which provided relief to the patient in the past.   Plan from 5/17/2021 - Patient status post treatment for H pylori of which she only completed 7 out of a planned 10-day course due to side effects.  She states that she feels better after this treatment and is also been on a gluten-free and low FODMAPs diet.  The patient continues to lose weight and also complains of constipation.  An H. pylori stool antigen will be sent to assess for eradication.  I have started the patient on Amitiza 24 mcg twice daily.  As the patient cannot take Ensure due to an elevated hemoglobin A1c, I advised consideration of Glucerna.  Patient will return to see me in 1 month to assess her response to the Amitiza.  Patient is due for colonoscopy in September 2022.   Plan from 12/23/2020 - Patient with weight loss and constipation.  She took 10 days of Xifaxan but stopped it early due to side effects for her small intestinal bacterial overgrowth.  The patient is moving her bowels on MiraLAX.  H. pylori he has not yet been treated.  Bloodwork was sent for CBC, chem-pack, TSH, celiac markers, amylase, lipase.  Patient was sent for an abdominal ultrasound.  I have advised the patient that she must take in more calories.  She was advised to add Ensure 2 to 3 cans a day to her diet.  Patient will begin a treatment regimen for H. pylori at the end of January which does not contain any medicine she is allergic to.  She will take right for Butin 150 mg twice daily, minocycline 100 mg twice daily, pantoprazole 40 mg twice daily for 10 days.  Patient is due for colonoscopy in September 2022.  Patient will return to see me in mid March to assess her response to the H. pylori treatment.   Plan from 12/8/2020 - Patient with ongoing H. pylori infection that has been unable to be treated due to her allergies to penicillin, Flagyl, and Levaquin.  She gets intermittent abdominal pain mainly in the left lower quadrant but had particularly sharp pain yesterday.  The pain is now back to a dull pain today.  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  She has responded to 14-day courses of Xifaxan in the past.  She also has a history of constipation but is moving her bowels every other day on daily MiraLAX.  I advised the patient that I will research to try to find any H. pylori regimen in the literature that does not include one of the medications that she is allergic to.  I will contact her regarding treatment.  If I am unable to find something, we will consider infectious disease and/or allergy consult.  Patient was given a 14-day course of Xifaxan 550 mg 3 times daily for her SIBO as she has shown a response to this in the past.  Patient will continue MiraLAX daily.  We will hold off on restarting pantoprazole as she states that this is not helped her.  Patient will return to see me in 2 weeks for an in person visit.  She was advised to call immediately if her abdominal pain worsens as I was unable to examine her today given that this was a televisit.  Patient is due for colonoscopy in September 2022.   Plan from 5/13/2020 - Patient found to have small intestinal bacterial overgrowth and fructose intolerance by breath testing.  She has a history of constipation.  She also has a history of Yoder's esophagus although this was not seen on her last endoscopy.  She had significant improvement in her symptoms after a 2-week course of Xifaxan and instituting a low FODMAPs diet.  She developed recurrent symptoms of abdominal pain, constipation, early satiety, bloating last week.  We will treat with a second 14-day course of Xifaxan 550 mg 3 times daily.  Patient will restart MiraLAX daily.  She will continue pantoprazole 40 mg a day and the low FODMAPs diet.  Once the COVID-19 pandemic allows, an EGD will be scheduled. The risks, benefits, alternatives, and limitations of the procedure were explained.  Patient is due for colonoscopy in September 2022.   Plan from 11/22/2019 - Patient with chronically elevated alkaline phosphatase that is of bone origin.  She has complaints of heartburn, early satiety, left upper quadrant pain radiating to the back episodically, bloating, gas.  She did have a response to pantoprazole in the past but stopped this.  She did not have a response to Amitiza.  Patient was sent for CT scan of the abdomen and pelvis to evaluate the left upper quadrant pain.  Patient was sent for breath testing for small intestinal bacterial overgrowth and fructose intolerance.  Patient was advised to take MiraLAX 17 g a day to help her move her bowels.  Patient was advised to restart pantoprazole 40 mg a day.  As the patient's symptoms appear to be dietary related, I strongly advised seeing a nutritionist.  The patient was given the name of a nutitionist, Wilfred Glass, to see.  Patient was advised to see a rheumatologist regarding her elevated JOHANA.  Patient is due for EGD in September 2020.  She has a history of Yoder's esophagus which was not seen on her last endoscopy.  Patient is due for colonoscopy in September 2022.   Plan from 6/28/2019 - Patient with elevated alkaline phosphatase and AST. She has H. pylori infection which has not been treated as she is allergic to medicines in every regimen to treat this. She has symptoms of epigastric pain, bloating, heartburn, and constipation. This appears to be due to irritable bowel syndrome.  Blood work was sent for LFTs, PT, alpha-1 antitrypsin, alpha-fetoprotein, JOHANA, ANCA, ceruloplasmin, iron studies, GGTP, celiac markers, hepatitis A., B., C. serologies, lipid profile, AMA, anti-smooth muscle antibody, anti-LKM antibody, anti-soluble liver antigen antibody, and alkaline phosphatase isoenzymes.  Patient was sent for an abdominal ultrasound.  I discussed IBS with the patient in depth. Information was given to her regarding a low FODMAPs diet. I advised that she see a nutritionist. She informs me that both of her sisters are nutritionists and she will see one of them regarding this.  The patient was started on pantoprazole 40 mg a day and Amitiza 8 mcg b.i.d.  The patient returned to see me in one month to assess her response to the medications and diet.

## 2025-07-11 NOTE — CONSULT LETTER
[FreeTextEntry1] : Dear Dr. Berta Dhillon,\par  \par  I had the pleasure of seeing your patient DANIEL PIPER in the office today.  My office note is attached. PLEASE READ THE "ASSESSMENT" SECTION OF THE NOTE TO SEE MY IMPRESSION AND PLAN.\par  \par  Thank you very much for allowing me to participate in the care of your patient.\par  \par  Sincerely,\par  \par  Laz Gonzalez M.D., FAC, FACP\par  Director, Celiac Program at [facility]\par   of Medicine, [employer] at [facility]  Adjunct  of Medicine, [employer]\[facility]  Practice Director, [facility] - Gastroenterology at [facility]  [address]\[place] [address]\par  Tel: [phone]\par  Email: [email or web address]\par  \par  \par  The attached note has been created using a voice recognition system (Dragon).  There may be some misspellings and typos.  Please call my office if you have any issues or questions.

## 2025-07-11 NOTE — HISTORY OF PRESENT ILLNESS
[FreeTextEntry1] : The patient has a 1 cm hiatal hernia and reflux esophagitis.  She is taking pantoprazole on a as needed basis taking it for a week at a time and then stopping.  She had an abdominal ultrasound on March 15, 2025 which was normal.  The patient is now seen because of recent blood work on July 3, 2025 which revealed AST and ALT of 63 and 58 respectively.  Alkaline phosphatase was elevated at 162 although this has been elevated for some time and appears to be from bony origin.  Total bilirubin was normal at 0.4.  The patient has been itchy and fatigued.  Although the elevated alkaline phosphatase has been present for some time, the elevated transaminases are new  The patient notes early satiety and mild nausea.  She denies vomiting and has not had any further episodes of vomiting/diarrhea which she had in the past.  She gets mild left-sided abdominal pain.  She can go 2 to 3 days without a bowel movement.  She is taking magnesium and is now going daily.  She saw a small amount of blood in the toilet water on 1 occasion.  She denies melena.  The patient's weight is stable.  Of note, the patient's father had liver cancer.   Note from 2/27/2025 - The patient has a 1 cm hiatal hernia and a history of biopsy evidence of reflux esophagitis.  She has been taking famotidine 40 mg at bedtime for the past month because she developed symptoms of epigastric burning along with awakening in the middle of the night with a cough and throat tickle/burning.  The famotidine has not really helped her.  She has had 3 episodes, 1 in December and 2 in January with sudden onset of nausea, vomiting, and diarrhea with multiple episodes of vomiting and diarrhea that last about 8 hours at a time and resolve spontaneously.  She has not had any episodes in February.  Other than that, she generally has 1 solid bowel movement a day without melena or bright red blood per rectum.  She takes magnesium glycine 8 to help her move her bowels.  She has had mild weight gain and now weighs 101 pounds.   Note from 5/1/2024 - The patient has a history of a 1 cm hiatal hernia with biopsy evidence of reflux esophagitis.  She had been on pantoprazole but now takes it only on a as needed basis.  She is concerned regarding the fact that she was diagnosed with osteoporosis and has been trying to avoid the PPI.  She also had previously been on MiraLAX but has been off this for a while.  She had been taking a magnesium pill a day and has been having a bowel movement every 1 to 2 days.  Last week, the patient had 5 days of significant diarrhea and cramping.  She went to urgent care twice.  The diarrhea resolved on Sunday and she has not moved her bowels over the past 3 days.  There was no melena, bright red blood per rectum, fever, nausea, vomiting.  She now has shooting right lower quadrant pain extending to the back that occurs intermittently over the past 3 days.  She also gets occasional heartburn about once a week.  The patient has not been admitted to the hospital in the past year and denies any cardiac issues.   Note from 10/31/2022 - The patient underwent EGD and colonoscopy on August 2, 2022.  EGD was significant for a 1 cm hiatal hernia with biopsy evidence of reflux esophagitis.  Colonoscopy was normal other than external hemorrhoids which are rarely symptomatic.  Random right and left colonic biopsies were negative.  The patient was seen in the office on August 5, 2022 with postprocedure abdominal pain.  She was sent to the ER where CT scan was negative and she was sent home.  The patient gradually felt better and those symptoms all resolved.  She is currently on MiraLAX and a probiotic daily.  She does complain of epigastric burning/chest burning which occurs a few times a week.  She also notes a cough with laying down.  She denies dysphagia.  She moves her bowels once every 1 to 2 days on the MiraLAX.  The stools are solid without melena or bright red blood per rectum.  She has now been gaining weight.   Note from 8/5/2022 - The patient is seen acutely after undergoing EGD and colonoscopy on August 2, 2022.  EGD was significant for a 1 cm hiatal hernia with an irregular Z-line with biopsies showing reflux esophagitis.  The patient also has a patulous pylorus.  Colonoscopy was normal with negative random right and left colonic biopsies.  The patient has external hemorrhoids which are generally asymptomatic.  On the day after the procedure, the patient states that she developed lower back pain which is worsening.  She also feels weakness and shakiness.  She notes decreased appetite.  She denies fever or cough.  She took a home COVID test today which was negative.  We also reviewed the recent studies that were done.  Gastric emptying scan was normal on May 5, 2022.  Stool tests showed no fat and normal fecal elastase.   Note from 4/7/2022 - We reviewed the events and evaluations since the patient's last visit on May 17, 2021.  H. pylori stool antigen was negative.  The patient took Amitiza 24 mcg twice daily through October and felt much better.  Her bowel movements were regular.  She ran out of the medication and still did well for a while.  She went to the [place] emergency room with pain on December 1 and had a CT scan which revealed a prominent gastric wall which may be due to partial underdistention and/or gastritis.  Endoscopy was recommended.  The patient went home and felt better with occasional bloating.  She started taking MiraLAX in January.  She had Covid in February 2022 with 10 days of fever.  She was not treated directly for Covid but was treated with Zithromax, which gave her an allergic reaction.  She recovered from the Covid.  She now complains of left upper quadrant pain radiating to the back.  She denies nausea vomiting.  She also notes early satiety.  She has small amounts of heartburn.  She denies dysphagia.  On Sunday, the patient had 5 episodes of diarrhea with oily stool but since then her stools are solid.  She denies melena, bright red blood per rectum, or fever.  Of note, the patient recalls having had problems with anesthesia during the last endoscopic procedure.   Note from 5/17/2021 - We reviewed the evaluations done since the patient's last visit of December 23, 2020.  Blood work from that day showed a mildly elevated amylase of 115 with mildly elevated pancreatic and salivary isoenzymes, only barely above normal.  Alkaline phosphatase was 146 with normal isoenzymes.  An abdominal ultrasound performed on December 26, 2020 was normal.  A subsequent MRI/MRCP performed on January 23, 2021 was also normal.  The patient was treated for H pylori with a regimen of rifabutin, minocycline, and pantoprazole.  The patient only took 7 days of the 10-day course because of weakness and shaking.  The patient also reports that she has been on a gluten-free and low FODMAPs diet.  She is feeling better and reports that she does not have nearly as much abdominal pain as she used to.  The patient was taking an Ensure daily but then was found to have a hemoglobin A1c of 6.8 and was told to stop this.  She has lost more weight and now weighs 91 pounds.  The patient reports occasional mild epigastric burning.  She denies heartburn or dysphagia.  The patient has been having constipation and had a period where she went 5 days without a bowel movement with associated left-sided pain.  She was on MiraLAX daily which is never helped her.  She tried milk of magnesia with minimal results.  She denies melena or bright red blood per rectum.   Note from 12/23/2020 - Since the patient's last visit on December 8, 2020, the patient took Xifaxan for small intestinal bacterial overgrowth.  She took it for 10 days but developed constipation despite being on MiraLAX along with lower back pain, cramping, and burning in both arms.  She stopped the medication yesterday.  Today she had 2 good bowel movements with the MiraLAX.  She denies melena or bright red blood per rectum.  She has been having a bowel movement every 1 to 2 days.  She denies nausea, vomiting, heartburn, dysphagia.  She reports a good appetite but eats small amounts because she does not feel well if she eats too much at one time.  She has continued to lose weight and currently weighs 95 pounds.  She has chronic H. pylori infection for which we have been unable to treat due to her multiple allergies.  I had contacted her after finding a regimen that does not include a medication she is allergic to.  She has not taken this yet as she was on the Xifaxan.   Note from 12/8/2020 - We reviewed the patient's EGD performed on September 24, 2020.  An irregular Z-line was seen but biopsies were negative for any evidence of intestinal metaplasia/Yoder's.  Biopsies from the stomach again showed the presence of H. pylori infection.  This has been seen in the past but the patient has been unable to receive treatment given her allergies to penicillin, Flagyl, and Levaquin, all of which have caused rash and hives.  The patient does get burning in her epigastrium and in her throat.  She had been on pantoprazole in the past but stopped this in September as she states that she did not feel that it helped her.    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  She gets intermittent pain in the left upper quadrant and left lower quadrant.  Yesterday, the patient developed left lower quadrant pain which was sharp and severe although it is much better today but remains as a dull pain.  The patient denies dysuria or hematuria.  She states that she is seen her gynecologist recently.  The patient has a history of constipation but has been taking MiraLAX daily and is moving her bowels once every other day.  The patient took a 14-day course of Xifaxan back in May, which she states helped her symptoms.  She has also been on a low FODMAPs diet, again which has led to improvement in her symptoms as well.  The patient does state that the effects of the Xifaxan have worn off and the symptoms of bloating have returned.  She reports that she weighs 97 pounds, slightly lower than the past.   Note from 5/13/2020 - We reviewed the evaluations done since the patient's last visit on November 22, 2019.  The patient underwent fructose breath testing which was positive for fructose intolerance.  The patient then had lactulose breath testing performed which was positive for small intestinal bacterial overgrowth with predominantly hydrogen producing microorganisms with some methane production.  The patient has a history of Yoder's esophagus although this was not seen on the last endoscopy.  She also has a history of a CT scan on November 9, 2019 showing moderate to severe stool burden suggestive of constipation.  The patient was treated with 14 days of Xifaxan 550 mg 3 times daily in February and feels that this helped her with "90% improvement.  She also consulted with a dietitian, Wilfred Glass, who started her on a low FODMAPs diet which she has been on since January.  The patient did very well in March without pain or need for MiraLAX.  She remains on pantoprazole 40 mg a day.  Last week, the patient developed recurrent abdominal pain in the left upper quadrant same as she had previously.  She also has had mild throat burning and bad taste in the mouth.  She complains of early satiety and feels like food is sitting in her stomach.  She also has had recurrent constipation.  She had one loose bowel movement last week but then went 2 to 3 days without a bowel movement.  She reports bloating and belching.  She has lost 1 to 2 pounds.  She saw her rheumatologist yesterday for joint and muscle pains and was placed on prednisone which she has not yet started.  The patient has not been admitted to the hospital in the past year and denies any cardiac issues.   Note from 11/22/2019 - We reviewed the patient's evaluations done since her last visit on June 28, 2019.  She has H. pylori which has not been treated secondary to her multiple allergies to medications.  Blood work from June revealed an alkaline phosphatase of 138 with a GGTP of 9 and a fractionation showing 70% bone etiology.  She has an elevated JOHANA of 1-3 20 and a ferritin of 13 with a 15% iron saturation.  Ultrasound performed on July 5, 2019 reveals a small nonobstructing kidney stone but is otherwise negative.  The patient had a bone scan that showed mild diffuse uptake which was thought to possibly represent hyperparathyroidism.  Blood work on September 21, 2019 showed normal PTH and calcium levels.  The patient took pantoprazole for 3 months and felt better but then stopped the medication.  She also took Amitiza for 1 month and stopped it after it did not help with her symptoms.  On Saturday, the patient went to the emergency room with 1 day of vomiting and diarrhea.  She was given IV fluid and her symptoms resolved.  She has had no bowel movement until today.  She continues to get left upper quadrant "inflammation" and pain which radiates to the back that last 2 to 3 days.  The patient will then decrease food intake and the pain will go away for a little while.  She has these episodes about once a week.  She also complains of bloating and gas.  She avoids dairy and is decreased gluten in her diet.  She continues to have heartburn and early satiety.  She also continues to have constipation.  She can go 7 days without a bowel movement and then have diarrhea.  The patient's weight is stable.   Note from 6/28/2019 - The patient has been seeing Dr. Hernandez and had EGD and colonoscopy showing ongoing H. pylori infection which cannot be treated due to the patient's multiple allergies. She had recent blood work showing an elevated alkaline phosphatase 152 and AST of 36. She has had elevated liver tests in the past. She complains of intermittent epigastric pain and bloating for which she takes Gas-X with some relief. She also gets frequent heartburn. She is constipated moving her bowels once every 4 days. She has been taking flaxseed. She denies melena or blood per rectum. The patient's weight is stable.

## 2025-07-11 NOTE — HISTORY OF PRESENT ILLNESS
[FreeTextEntry1] : The patient has a 1 cm hiatal hernia and reflux esophagitis.  She is taking pantoprazole on a as needed basis taking it for a week at a time and then stopping.  She had an abdominal ultrasound on March 15, 2025 which was normal.  The patient is now seen because of recent blood work on July 3, 2025 which revealed AST and ALT of 63 and 58 respectively.  Alkaline phosphatase was elevated at 162 although this has been elevated for some time and appears to be from bony origin.  Total bilirubin was normal at 0.4.  The patient has been itchy and fatigued.  Although the elevated alkaline phosphatase has been present for some time, the elevated transaminases are new  The patient notes early satiety and mild nausea.  She denies vomiting and has not had any further episodes of vomiting/diarrhea which she had in the past.  She gets mild left-sided abdominal pain.  She can go 2 to 3 days without a bowel movement.  She is taking magnesium and is now going daily.  She saw a small amount of blood in the toilet water on 1 occasion.  She denies melena.  The patient's weight is stable.  Of note, the patient's father had liver cancer.   Note from 2/27/2025 - The patient has a 1 cm hiatal hernia and a history of biopsy evidence of reflux esophagitis.  She has been taking famotidine 40 mg at bedtime for the past month because she developed symptoms of epigastric burning along with awakening in the middle of the night with a cough and throat tickle/burning.  The famotidine has not really helped her.  She has had 3 episodes, 1 in December and 2 in January with sudden onset of nausea, vomiting, and diarrhea with multiple episodes of vomiting and diarrhea that last about 8 hours at a time and resolve spontaneously.  She has not had any episodes in February.  Other than that, she generally has 1 solid bowel movement a day without melena or bright red blood per rectum.  She takes magnesium glycine 8 to help her move her bowels.  She has had mild weight gain and now weighs 101 pounds.   Note from 5/1/2024 - The patient has a history of a 1 cm hiatal hernia with biopsy evidence of reflux esophagitis.  She had been on pantoprazole but now takes it only on a as needed basis.  She is concerned regarding the fact that she was diagnosed with osteoporosis and has been trying to avoid the PPI.  She also had previously been on MiraLAX but has been off this for a while.  She had been taking a magnesium pill a day and has been having a bowel movement every 1 to 2 days.  Last week, the patient had 5 days of significant diarrhea and cramping.  She went to urgent care twice.  The diarrhea resolved on Sunday and she has not moved her bowels over the past 3 days.  There was no melena, bright red blood per rectum, fever, nausea, vomiting.  She now has shooting right lower quadrant pain extending to the back that occurs intermittently over the past 3 days.  She also gets occasional heartburn about once a week.  The patient has not been admitted to the hospital in the past year and denies any cardiac issues.   Note from 10/31/2022 - The patient underwent EGD and colonoscopy on August 2, 2022.  EGD was significant for a 1 cm hiatal hernia with biopsy evidence of reflux esophagitis.  Colonoscopy was normal other than external hemorrhoids which are rarely symptomatic.  Random right and left colonic biopsies were negative.  The patient was seen in the office on August 5, 2022 with postprocedure abdominal pain.  She was sent to the ER where CT scan was negative and she was sent home.  The patient gradually felt better and those symptoms all resolved.  She is currently on MiraLAX and a probiotic daily.  She does complain of epigastric burning/chest burning which occurs a few times a week.  She also notes a cough with laying down.  She denies dysphagia.  She moves her bowels once every 1 to 2 days on the MiraLAX.  The stools are solid without melena or bright red blood per rectum.  She has now been gaining weight.   Note from 8/5/2022 - The patient is seen acutely after undergoing EGD and colonoscopy on August 2, 2022.  EGD was significant for a 1 cm hiatal hernia with an irregular Z-line with biopsies showing reflux esophagitis.  The patient also has a patulous pylorus.  Colonoscopy was normal with negative random right and left colonic biopsies.  The patient has external hemorrhoids which are generally asymptomatic.  On the day after the procedure, the patient states that she developed lower back pain which is worsening.  She also feels weakness and shakiness.  She notes decreased appetite.  She denies fever or cough.  She took a home COVID test today which was negative.  We also reviewed the recent studies that were done.  Gastric emptying scan was normal on May 5, 2022.  Stool tests showed no fat and normal fecal elastase.   Note from 4/7/2022 - We reviewed the events and evaluations since the patient's last visit on May 17, 2021.  H. pylori stool antigen was negative.  The patient took Amitiza 24 mcg twice daily through October and felt much better.  Her bowel movements were regular.  She ran out of the medication and still did well for a while.  She went to the [facility] emergency room with pain on December 1 and had a CT scan which revealed a prominent gastric wall which may be due to partial underdistention and/or gastritis.  Endoscopy was recommended.  The patient went home and felt better with occasional bloating.  She started taking MiraLAX in January.  She had Covid in February 2022 with 10 days of fever.  She was not treated directly for Covid but was treated with Zithromax, which gave her an allergic reaction.  She recovered from the Covid.  She now complains of left upper quadrant pain radiating to the back.  She denies nausea vomiting.  She also notes early satiety.  She has small amounts of heartburn.  She denies dysphagia.  On Sunday, the patient had 5 episodes of diarrhea with oily stool but since then her stools are solid.  She denies melena, bright red blood per rectum, or fever.  Of note, the patient recalls having had problems with anesthesia during the last endoscopic procedure.   Note from 5/17/2021 - We reviewed the evaluations done since the patient's last visit of December 23, 2020.  Blood work from that day showed a mildly elevated amylase of 115 with mildly elevated pancreatic and salivary isoenzymes, only barely above normal.  Alkaline phosphatase was 146 with normal isoenzymes.  An abdominal ultrasound performed on December 26, 2020 was normal.  A subsequent MRI/MRCP performed on January 23, 2021 was also normal.  The patient was treated for H pylori with a regimen of rifabutin, minocycline, and pantoprazole.  The patient only took 7 days of the 10-day course because of weakness and shaking.  The patient also reports that she has been on a gluten-free and low FODMAPs diet.  She is feeling better and reports that she does not have nearly as much abdominal pain as she used to.  The patient was taking an Ensure daily but then was found to have a hemoglobin A1c of 6.8 and was told to stop this.  She has lost more weight and now weighs 91 pounds.  The patient reports occasional mild epigastric burning.  She denies heartburn or dysphagia.  The patient has been having constipation and had a period where she went 5 days without a bowel movement with associated left-sided pain.  She was on MiraLAX daily which is never helped her.  She tried milk of magnesia with minimal results.  She denies melena or bright red blood per rectum.   Note from 12/23/2020 - Since the patient's last visit on December 8, 2020, the patient took Xifaxan for small intestinal bacterial overgrowth.  She took it for 10 days but developed constipation despite being on MiraLAX along with lower back pain, cramping, and burning in both arms.  She stopped the medication yesterday.  Today she had 2 good bowel movements with the MiraLAX.  She denies melena or bright red blood per rectum.  She has been having a bowel movement every 1 to 2 days.  She denies nausea, vomiting, heartburn, dysphagia.  She reports a good appetite but eats small amounts because she does not feel well if she eats too much at one time.  She has continued to lose weight and currently weighs 95 pounds.  She has chronic H. pylori infection for which we have been unable to treat due to her multiple allergies.  I had contacted her after finding a regimen that does not include a medication she is allergic to.  She has not taken this yet as she was on the Xifaxan.   Note from 12/8/2020 - We reviewed the patient's EGD performed on September 24, 2020.  An irregular Z-line was seen but biopsies were negative for any evidence of intestinal metaplasia/Yoder's.  Biopsies from the stomach again showed the presence of H. pylori infection.  This has been seen in the past but the patient has been unable to receive treatment given her allergies to penicillin, Flagyl, and Levaquin, all of which have caused rash and hives.  The patient does get burning in her epigastrium and in her throat.  She had been on pantoprazole in the past but stopped this in September as she states that she did not feel that it helped her.    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  She gets intermittent pain in the left upper quadrant and left lower quadrant.  Yesterday, the patient developed left lower quadrant pain which was sharp and severe although it is much better today but remains as a dull pain.  The patient denies dysuria or hematuria.  She states that she is seen her gynecologist recently.  The patient has a history of constipation but has been taking MiraLAX daily and is moving her bowels once every other day.  The patient took a 14-day course of Xifaxan back in May, which she states helped her symptoms.  She has also been on a low FODMAPs diet, again which has led to improvement in her symptoms as well.  The patient does state that the effects of the Xifaxan have worn off and the symptoms of bloating have returned.  She reports that she weighs 97 pounds, slightly lower than the past.   Note from 5/13/2020 - We reviewed the evaluations done since the patient's last visit on November 22, 2019.  The patient underwent fructose breath testing which was positive for fructose intolerance.  The patient then had lactulose breath testing performed which was positive for small intestinal bacterial overgrowth with predominantly hydrogen producing microorganisms with some methane production.  The patient has a history of Yoder's esophagus although this was not seen on the last endoscopy.  She also has a history of a CT scan on November 9, 2019 showing moderate to severe stool burden suggestive of constipation.  The patient was treated with 14 days of Xifaxan 550 mg 3 times daily in February and feels that this helped her with "90% improvement.  She also consulted with a dietitian, Wilfred Glass, who started her on a low FODMAPs diet which she has been on since January.  The patient did very well in March without pain or need for MiraLAX.  She remains on pantoprazole 40 mg a day.  Last week, the patient developed recurrent abdominal pain in the left upper quadrant same as she had previously.  She also has had mild throat burning and bad taste in the mouth.  She complains of early satiety and feels like food is sitting in her stomach.  She also has had recurrent constipation.  She had one loose bowel movement last week but then went 2 to 3 days without a bowel movement.  She reports bloating and belching.  She has lost 1 to 2 pounds.  She saw her rheumatologist yesterday for joint and muscle pains and was placed on prednisone which she has not yet started.  The patient has not been admitted to the hospital in the past year and denies any cardiac issues.   Note from 11/22/2019 - We reviewed the patient's evaluations done since her last visit on June 28, 2019.  She has H. pylori which has not been treated secondary to her multiple allergies to medications.  Blood work from June revealed an alkaline phosphatase of 138 with a GGTP of 9 and a fractionation showing 70% bone etiology.  She has an elevated JOHANA of 1-3 20 and a ferritin of 13 with a 15% iron saturation.  Ultrasound performed on July 5, 2019 reveals a small nonobstructing kidney stone but is otherwise negative.  The patient had a bone scan that showed mild diffuse uptake which was thought to possibly represent hyperparathyroidism.  Blood work on September 21, 2019 showed normal PTH and calcium levels.  The patient took pantoprazole for 3 months and felt better but then stopped the medication.  She also took Amitiza for 1 month and stopped it after it did not help with her symptoms.  On Saturday, the patient went to the emergency room with 1 day of vomiting and diarrhea.  She was given IV fluid and her symptoms resolved.  She has had no bowel movement until today.  She continues to get left upper quadrant "inflammation" and pain which radiates to the back that last 2 to 3 days.  The patient will then decrease food intake and the pain will go away for a little while.  She has these episodes about once a week.  She also complains of bloating and gas.  She avoids dairy and is decreased gluten in her diet.  She continues to have heartburn and early satiety.  She also continues to have constipation.  She can go 7 days without a bowel movement and then have diarrhea.  The patient's weight is stable.   Note from 6/28/2019 - The patient has been seeing Dr. Hernandez and had EGD and colonoscopy showing ongoing H. pylori infection which cannot be treated due to the patient's multiple allergies. She had recent blood work showing an elevated alkaline phosphatase 152 and AST of 36. She has had elevated liver tests in the past. She complains of intermittent epigastric pain and bloating for which she takes Gas-X with some relief. She also gets frequent heartburn. She is constipated moving her bowels once every 4 days. She has been taking flaxseed. She denies melena or blood per rectum. The patient's weight is stable.

## 2025-07-11 NOTE — ASSESSMENT
[FreeTextEntry1] : Patient with new elevated transaminases.  She has a chronically elevated alkaline phosphatase which appears to be from bony etiology.  She had a normal abdominal ultrasound in March.  She has a history of reflux esophagitis and a 1 cm hiatal hernia.  She does get early satiety and mild nausea.  We must consider acute and chronic etiologies of liver disease.  Blood work was sent for LFTs, PT, alpha-1 antitrypsin, alpha-fetoprotein, JOHANA, ANCA, ceruloplasmin, iron studies, GGTP, celiac markers, hepatitis A., B., C. serologies, lipid profile, AMA, anti-smooth muscle antibody, anti-LKM antibody, anti-soluble liver antigen antibody, CBC, alkaline phosphatase isoenzymes, CMV IgG and IgM.  The patient will return to see me in 6 weeks.  We will reassess the liver at that time.  The patient will need an EGD but we should first assess the liver.  Patient is due for colonoscopy in August 2027.   Plan from 2/27/2025 - Patient with a 1 cm hiatal hernia and biopsy evidence of reflux esophagitis who has been experiencing epigastric burning and awakening at night with throat burning and tickle and cough.  She also has had 3 episodes of sudden onset of nausea, vomiting, and diarrhea that can last 8 hours.  I have started the patient on pantoprazole 40 mg a day to be taken in the morning before breakfast.  A list of dietary and lifestyle modifications in the treatment of GERD was given to the patient.  We discussed this in depth.  The patient was sent for an abdominal ultrasound to rule out biliary etiology of the episodes of nausea and vomiting.  The patient will call me in 2 to 3 weeks to give me an update.  If the ultrasound is unrevealing and symptoms persist, we would then proceed with a HIDA scan.  Patient is due for colonoscopy in August 2027.   Plan from 5/1/2024 - Patient status post what sounds like a self-limited gastroenteritis last week.  She had diarrhea but now has not moved her bowels over the past 3 days.  She has intermittent shooting right lower quadrant pain over the past 3 days.  This is likely related to the gastroenteritis and will likely resolve.  The patient was advised to observe her symptoms over the next week.  She will restart magnesium pills which have helped her have regular bowel movements.  If the pain persist beyond a week, we will then get a CT scan of the abdomen and pelvis to rule out more chronic etiology.  The patient was also given famotidine 40 mg to use on a as needed basis as she is concerned about the use of PPI.  Patient is due for colonoscopy in August 2027.   Plan from 10/31/2022 - Patient with a 1 cm hiatal hernia and biopsy evidence of reflux esophagitis who has been having epigastric burning and cough with laying down.  She has constipation which is controlled with daily MiraLAX.  I have started the patient on pantoprazole 20 mg a day.  She was advised to avoid late night eating.  She was advised to stay upright for 2 to 3 hours after eating.  She was also advised regarding keeping her head of the bed elevated at night.  Patient will continue MiraLAX daily.  We will repeat a colonoscopy in 5 to 10 years.  Patient will return to see me in 1 year or sooner if needed.   Plan from 8/5/2022 - Patient with significant epigastric tenderness on physical exam with radiation of the pain to the back.  She is status post EGD and colonoscopy 3 days ago.  The exams were significant only for a 1 cm hiatal hernia with evidence of reflux esophagitis.  The patient complains of lower back pain, weakness, shakiness, and decreased appetite.  The patient was advised to go to the emergency room for urgent evaluation including blood work and CT scan.  I am concerned regarding pancreatitis but also must exclude perforation despite the fact that the patient's abdomen is soft.  The patient also would benefit from intravenous fluid.  She has opted to go to [facility].  We are covered by the [facility] in the hospital.   Plan from 4/7/2022 - Patient with multiple GI issues.  Most recently, she has left upper quadrant pain rating to the back along with early satiety.  She had oily stool over the weekend but generally has constipation and has done well on Amitiza in the past.  CT scan in the emergency room revealed a prominent gastric wall, which may or may not be significant.  An EGD and colonoscopy have been scheduled. The risks, benefits, alternatives, and limitations of the procedures, including the possibility of missed lesions, were explained.  The patient will require anesthesia evaluation prior to the procedures given the reported history of difficulty with anesthesia during the last procedure.  Patient was sent for a gastric emptying scan to rule out gastroparesis.  Stool will be sent for fecal elastase and fat.  I restarted the patient on Amitiza 24 mcg twice daily which provided relief to the patient in the past.   Plan from 5/17/2021 - Patient status post treatment for H pylori of which she only completed 7 out of a planned 10-day course due to side effects.  She states that she feels better after this treatment and is also been on a gluten-free and low FODMAPs diet.  The patient continues to lose weight and also complains of constipation.  An H. pylori stool antigen will be sent to assess for eradication.  I have started the patient on Amitiza 24 mcg twice daily.  As the patient cannot take Ensure due to an elevated hemoglobin A1c, I advised consideration of Glucerna.  Patient will return to see me in 1 month to assess her response to the Amitiza.  Patient is due for colonoscopy in September 2022.   Plan from 12/23/2020 - Patient with weight loss and constipation.  She took 10 days of Xifaxan but stopped it early due to side effects for her small intestinal bacterial overgrowth.  The patient is moving her bowels on MiraLAX.  H. pylori he has not yet been treated.  Bloodwork was sent for CBC, chem-pack, TSH, celiac markers, amylase, lipase.  Patient was sent for an abdominal ultrasound.  I have advised the patient that she must take in more calories.  She was advised to add Ensure 2 to 3 cans a day to her diet.  Patient will begin a treatment regimen for H. pylori at the end of January which does not contain any medicine she is allergic to.  She will take right for Butin 150 mg twice daily, minocycline 100 mg twice daily, pantoprazole 40 mg twice daily for 10 days.  Patient is due for colonoscopy in September 2022.  Patient will return to see me in mid March to assess her response to the H. pylori treatment.   Plan from 12/8/2020 - Patient with ongoing H. pylori infection that has been unable to be treated due to her allergies to penicillin, Flagyl, and Levaquin.  She gets intermittent abdominal pain mainly in the left lower quadrant but had particularly sharp pain yesterday.  The pain is now back to a dull pain today.  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  She has responded to 14-day courses of Xifaxan in the past.  She also has a history of constipation but is moving her bowels every other day on daily MiraLAX.  I advised the patient that I will research to try to find any H. pylori regimen in the literature that does not include one of the medications that she is allergic to.  I will contact her regarding treatment.  If I am unable to find something, we will consider infectious disease and/or allergy consult.  Patient was given a 14-day course of Xifaxan 550 mg 3 times daily for her SIBO as she has shown a response to this in the past.  Patient will continue MiraLAX daily.  We will hold off on restarting pantoprazole as she states that this is not helped her.  Patient will return to see me in 2 weeks for an in person visit.  She was advised to call immediately if her abdominal pain worsens as I was unable to examine her today given that this was a televisit.  Patient is due for colonoscopy in September 2022.   Plan from 5/13/2020 - Patient found to have small intestinal bacterial overgrowth and fructose intolerance by breath testing.  She has a history of constipation.  She also has a history of Yoder's esophagus although this was not seen on her last endoscopy.  She had significant improvement in her symptoms after a 2-week course of Xifaxan and instituting a low FODMAPs diet.  She developed recurrent symptoms of abdominal pain, constipation, early satiety, bloating last week.  We will treat with a second 14-day course of Xifaxan 550 mg 3 times daily.  Patient will restart MiraLAX daily.  She will continue pantoprazole 40 mg a day and the low FODMAPs diet.  Once the COVID-19 pandemic allows, an EGD will be scheduled. The risks, benefits, alternatives, and limitations of the procedure were explained.  Patient is due for colonoscopy in September 2022.   Plan from 11/22/2019 - Patient with chronically elevated alkaline phosphatase that is of bone origin.  She has complaints of heartburn, early satiety, left upper quadrant pain radiating to the back episodically, bloating, gas.  She did have a response to pantoprazole in the past but stopped this.  She did not have a response to Amitiza.  Patient was sent for CT scan of the abdomen and pelvis to evaluate the left upper quadrant pain.  Patient was sent for breath testing for small intestinal bacterial overgrowth and fructose intolerance.  Patient was advised to take MiraLAX 17 g a day to help her move her bowels.  Patient was advised to restart pantoprazole 40 mg a day.  As the patient's symptoms appear to be dietary related, I strongly advised seeing a nutritionist.  The patient was given the name of a nutitionist, Wilfred Glass, to see.  Patient was advised to see a rheumatologist regarding her elevated JOHANA.  Patient is due for EGD in September 2020.  She has a history of Yoder's esophagus which was not seen on her last endoscopy.  Patient is due for colonoscopy in September 2022.   Plan from 6/28/2019 - Patient with elevated alkaline phosphatase and AST. She has H. pylori infection which has not been treated as she is allergic to medicines in every regimen to treat this. She has symptoms of epigastric pain, bloating, heartburn, and constipation. This appears to be due to irritable bowel syndrome.  Blood work was sent for LFTs, PT, alpha-1 antitrypsin, alpha-fetoprotein, JOHANA, ANCA, ceruloplasmin, iron studies, GGTP, celiac markers, hepatitis A., B., C. serologies, lipid profile, AMA, anti-smooth muscle antibody, anti-LKM antibody, anti-soluble liver antigen antibody, and alkaline phosphatase isoenzymes.  Patient was sent for an abdominal ultrasound.  I discussed IBS with the patient in depth. Information was given to her regarding a low FODMAPs diet. I advised that she see a nutritionist. She informs me that both of her sisters are nutritionists and she will see one of them regarding this.  The patient was started on pantoprazole 40 mg a day and Amitiza 8 mcg b.i.d.  The patient returned to see me in one month to assess her response to the medications and diet.

## 2025-07-30 NOTE — HISTORY OF PRESENT ILLNESS
[FreeTextEntry1] : 62-year-old female presents for follow-up. [de-identified] : Patient is a 62-year-old female with past medical history significant for osteoporosis, GERD, Yoder's esophagus, IBS, hyperlipidemia, hypothyroidism, lactose intolerance, abnormal LFTs, presents for follow-up. She was recently seen by her gastroenterologist, Dr. Gonzalez. Blood blood test results done by him revealed markedly elevated cholesterol levels, elevated JOHANA alkaline phosphatase.  Liver tests were also noted to be elevated but improved compared to prior blood test.  She was advised to follow-up here. Patient has a long history of elevated alkaline phosphatase. Nuclear medicine bone scan formed 8/19/2019 revealed: Mild diffuse increased axial skeleton and calvarial relative uptake without focality possibly representing underlying metabolic derangement (i.e. hyperparathyroidism).  No definite evidence of metastatic or pagetoid disease. Patient continues to complain of generalized fatigue and generalized aches and pains.  She has been trying to adhere to a low-cholesterol diet and efforts to improve cholesterol level.  She has declined and continues to decline lipid-lowering therapy at this time. CT heart calcium score performed 2/20/2023.  Agaston score at that time = 0. Patient is scheduled to follow-up with her rheumatologist next week. She expresses concerns about osteoporosis and is considering treatment.  She was seen by endocrinologist November, 2024 at which time options were reviewed with the patient.  She did not proceed with treatment.

## 2025-07-30 NOTE — HISTORY OF PRESENT ILLNESS
[FreeTextEntry1] : 62-year-old female presents for follow-up. [de-identified] : Patient is a 62-year-old female with past medical history significant for osteoporosis, GERD, Yoder's esophagus, IBS, hyperlipidemia, hypothyroidism, lactose intolerance, abnormal LFTs, presents for follow-up. She was recently seen by her gastroenterologist, Dr. Gonzalez. Blood blood test results done by him revealed markedly elevated cholesterol levels, elevated JOHANA alkaline phosphatase.  Liver tests were also noted to be elevated but improved compared to prior blood test.  She was advised to follow-up here. Patient has a long history of elevated alkaline phosphatase. Nuclear medicine bone scan formed 8/19/2019 revealed: Mild diffuse increased axial skeleton and calvarial relative uptake without focality possibly representing underlying metabolic derangement (i.e. hyperparathyroidism).  No definite evidence of metastatic or pagetoid disease. Patient continues to complain of generalized fatigue and generalized aches and pains.  She has been trying to adhere to a low-cholesterol diet and efforts to improve cholesterol level.  She has declined and continues to decline lipid-lowering therapy at this time. CT heart calcium score performed 2/20/2023.  Agaston score at that time = 0. Patient is scheduled to follow-up with her rheumatologist next week. She expresses concerns about osteoporosis and is considering treatment.  She was seen by endocrinologist November, 2024 at which time options were reviewed with the patient.  She did not proceed with treatment.